# Patient Record
Sex: MALE | Race: WHITE
[De-identification: names, ages, dates, MRNs, and addresses within clinical notes are randomized per-mention and may not be internally consistent; named-entity substitution may affect disease eponyms.]

---

## 2021-04-25 ENCOUNTER — HOSPITAL ENCOUNTER (EMERGENCY)
Dept: HOSPITAL 26 - MED | Age: 51
Discharge: HOME | End: 2021-04-25
Payer: COMMERCIAL

## 2021-04-25 VITALS — SYSTOLIC BLOOD PRESSURE: 136 MMHG | DIASTOLIC BLOOD PRESSURE: 78 MMHG

## 2021-04-25 VITALS — HEIGHT: 68 IN | BODY MASS INDEX: 32.89 KG/M2 | WEIGHT: 217 LBS

## 2021-04-25 VITALS — DIASTOLIC BLOOD PRESSURE: 80 MMHG | SYSTOLIC BLOOD PRESSURE: 134 MMHG

## 2021-04-25 DIAGNOSIS — M79.10: ICD-10-CM

## 2021-04-25 DIAGNOSIS — F17.210: ICD-10-CM

## 2021-04-25 DIAGNOSIS — M79.661: Primary | ICD-10-CM

## 2021-04-25 PROCEDURE — 96372 THER/PROPH/DIAG INJ SC/IM: CPT

## 2021-04-25 PROCEDURE — 99284 EMERGENCY DEPT VISIT MOD MDM: CPT

## 2021-04-25 PROCEDURE — 93971 EXTREMITY STUDY: CPT

## 2021-04-25 NOTE — NUR
49 y/o M brought in from home with c/c right calf pain. Patient states right 
calf swelling and headache that began while at the grocery store 2 weeks ago. 
Patient states the swelling persisted and progressively worsen. Patient reports 
"bump at the top of my right calf." Patient reports headache to top of head, 
3/10, "squeezing/constant, non-radiating. Pt states he took an Advil for the 
headache yesterday without relief. Patient also reports productive cough with 
brown/grey mucus, however, states that is normal for him due to hx of smoking. 
Pt denies chest pain, shortness of breath, dizziness, fever/chills, N/V/D, 
exposure to Covid + patients. Pt placed onto cardiac monitor, placed into a 
gown. Lung sounds CTA. Oral temperature 99.1. Bed locked in lowest position, 
side rails x 1



PMH: Smoker, family hx blood clots

Meds: Denies

NKA

Sx: Denies

## 2021-04-25 NOTE — NUR
Patient discharged with v/s stable. Written and verbal after care instructions 
given and explained. 

Patient alert, oriented and verbalized understanding of instructions. 
Ambulatory with steady gait. All questions addressed prior to discharge. ID 
band removed. Patient advised to follow up with PMD. Rx of 
Hydrocodone/Acetaminophen given. Patient educated on indication of medication 
including possible reaction and side effects. Opportunity to ask questions 
provided and answered.

## 2021-04-25 NOTE — NUR
Patient states positive relief after 60mg Toradol IM. Patient states right calf 
pain 0/10 at this time.

## 2021-04-25 NOTE — NUR
Patient presents with both eyes open on the phone in semi-fowlers position. 
Cardiac monitor in place. Respirations even/unlabored. Bed locked in lowest 
position, side rails x 1, call light in reach.